# Patient Record
Sex: FEMALE | Race: WHITE | NOT HISPANIC OR LATINO | Employment: FULL TIME | URBAN - NONMETROPOLITAN AREA
[De-identification: names, ages, dates, MRNs, and addresses within clinical notes are randomized per-mention and may not be internally consistent; named-entity substitution may affect disease eponyms.]

---

## 2023-05-12 ENCOUNTER — APPOINTMENT (OUTPATIENT)
Dept: RADIOLOGY | Facility: CLINIC | Age: 48
End: 2023-05-12

## 2023-05-12 ENCOUNTER — OFFICE VISIT (OUTPATIENT)
Dept: URGENT CARE | Facility: CLINIC | Age: 48
End: 2023-05-12

## 2023-05-12 VITALS — TEMPERATURE: 98.3 F | HEART RATE: 86 BPM | OXYGEN SATURATION: 97 % | RESPIRATION RATE: 18 BRPM

## 2023-05-12 DIAGNOSIS — M25.511 ACUTE PAIN OF RIGHT SHOULDER: ICD-10-CM

## 2023-05-12 DIAGNOSIS — M75.31 CALCIFIC TENDINITIS OF RIGHT SHOULDER: Primary | ICD-10-CM

## 2023-05-12 RX ORDER — PREDNISONE 10 MG/1
10 TABLET ORAL DAILY
Qty: 5 TABLET | Refills: 0 | Status: CANCELLED
Start: 2023-05-12 | End: 2023-05-17

## 2023-05-12 RX ORDER — ENALAPRIL MALEATE AND HYDROCHLOROTHIAZIDE 10; 25 MG/1; MG/1
1 TABLET ORAL DAILY
COMMUNITY

## 2023-05-12 RX ORDER — ATORVASTATIN CALCIUM 20 MG/1
20 TABLET, FILM COATED ORAL DAILY
COMMUNITY

## 2023-05-12 RX ORDER — FENOFIBRATE 160 MG/1
160 TABLET ORAL DAILY
COMMUNITY

## 2023-05-12 NOTE — PROGRESS NOTES
Assessment/Plan    Calcific tendinitis of right shoulder [M75 31]  1  Calcific tendinitis of right shoulder  Ambulatory Referral to Physical Therapy      2  Acute pain of right shoulder  XR shoulder 2+ vw right        XR R shoulder: chronic calcific changes noted per my read  Pending final radiology read  Advised to rest, use ice, take OTC Ibuprofen and Tylenol  May use topical Voltaren as needed  Discussed discontinuing use of sling and risks associated with continued used noted with Calcific Tendinitis  Referral provided for PT  Verbal order for Prednisone called into [facility] on [address], [place]  Take Prednisone taper as directed  Recommended to follow up with Ortho, or preferred specialist      Subjective:     Patient ID: Marilyn Lynn is a 50 y o  female  Reason For Visit / Chief Complaint  Chief Complaint   Patient presents with   • Shoulder Pain     Right shoulder pain initial onset 5/9/23 denies any trauma tried nsaids and chiropractor without any relief presents to clinic with shoulder to right shoulder         Pt is a 51 y/o F who presents to the clinic today with c/o R shoulder pain that started 5/9/23  Denies any acute injury or trama to the R shoulder  On 5/10/23 went to the chiropractor, with no relief  Pt rating current pain 9/10 on numeric pain scale, nothing makes it better, feels like she is having a spasm in her R shoulder blade  Pt went to work today, came home and said she was unable to dress herself or use the toilet because she is R hand dominant  Applied a R arm/shoulder sling, with no relief  Per pt she tried ice, topical Voltaren, Toradol, Ibuprofen and utilizing a hot tub with no relief  LMP approx 1 month ago, menstrual periods noted to be irregular with a history of a Tubal Ligation  Denies any numbness or tingling       Follows with Ortho, Sports Med and pain management for bursitis of L shoulder per pt she has received an injection into her L shoulder that resolved the pain  Past Medical History:   Diagnosis Date   • Hyperlipemia    • Hypertension        Past Surgical History:   Procedure Laterality Date   • CHOLECYSTECTOMY     • TUBAL LIGATION         History reviewed  No pertinent family history  Review of Systems   Constitutional: Positive for activity change  Negative for chills and fever  HENT: Negative  Eyes: Negative  Respiratory: Negative  Cardiovascular: Negative  Gastrointestinal: Negative  Genitourinary: Negative  Musculoskeletal: Positive for arthralgias, joint swelling and myalgias  Negative for back pain  Skin: Negative  Neurological: Negative  All other systems reviewed and are negative  Objective:    Pulse 86   Temp 98 3 °F (36 8 °C)   Resp 18   SpO2 97%     Physical Exam  Constitutional:       Appearance: Normal appearance  HENT:      Head: Normocephalic  Mouth/Throat:      Mouth: Mucous membranes are moist    Cardiovascular:      Rate and Rhythm: Normal rate and regular rhythm  Pulses: Normal pulses  Heart sounds: Normal heart sounds  Pulmonary:      Effort: Pulmonary effort is normal       Breath sounds: Normal breath sounds  Musculoskeletal:      Right shoulder: Tenderness and bony tenderness present  No swelling, deformity or crepitus  Decreased range of motion  Decreased strength  Normal pulse  Left shoulder: Normal    Neurological:      Mental Status: She is alert

## 2023-05-12 NOTE — PATIENT INSTRUCTIONS
Rest, Ice, OTC Ibuprofen and topical Voltaren as needed  Order for PT Eval and Tx  Discontinue use of sling  Advise to go to the ER with any worsen symptoms, any numbness, tingling or increased pain

## 2024-08-13 ENCOUNTER — OFFICE VISIT (OUTPATIENT)
Dept: URGENT CARE | Facility: CLINIC | Age: 49
End: 2024-08-13
Payer: OTHER GOVERNMENT

## 2024-08-13 ENCOUNTER — APPOINTMENT (OUTPATIENT)
Dept: RADIOLOGY | Facility: CLINIC | Age: 49
End: 2024-08-13
Payer: OTHER GOVERNMENT

## 2024-08-13 VITALS
RESPIRATION RATE: 18 BRPM | TEMPERATURE: 98.7 F | HEART RATE: 91 BPM | SYSTOLIC BLOOD PRESSURE: 124 MMHG | OXYGEN SATURATION: 97 % | DIASTOLIC BLOOD PRESSURE: 82 MMHG

## 2024-08-13 DIAGNOSIS — S91.331A PUNCTURE WOUND OF RIGHT FOOT, INITIAL ENCOUNTER: Primary | ICD-10-CM

## 2024-08-13 DIAGNOSIS — M79.671 RIGHT FOOT PAIN: ICD-10-CM

## 2024-08-13 PROCEDURE — 99213 OFFICE O/P EST LOW 20 MIN: CPT

## 2024-08-13 PROCEDURE — 73620 X-RAY EXAM OF FOOT: CPT

## 2024-08-13 RX ORDER — SIMVASTATIN 20 MG
20 TABLET ORAL
COMMUNITY

## 2024-08-13 RX ORDER — CEPHALEXIN 500 MG/1
500 CAPSULE ORAL EVERY 8 HOURS SCHEDULED
Qty: 21 CAPSULE | Refills: 0 | Status: SHIPPED | OUTPATIENT
Start: 2024-08-13 | End: 2024-08-20

## 2024-08-13 RX ORDER — GABAPENTIN 100 MG/1
100 CAPSULE ORAL 3 TIMES DAILY
COMMUNITY

## 2024-08-13 NOTE — PROGRESS NOTES
[facility]        NAME: Nicole Quinones is a 49 y.o. female  : 1975    MRN: [id number]  DATE: 2024  TIME: 3:40 PM    Assessment and Plan   Puncture wound of right foot, initial encounter [S91.331A]  1. Puncture wound of right foot, initial encounter  XR foot 2 vw right    cephalexin (KEFLEX) 500 mg capsule        No obvious foreign body on x-ray.  Official x-ray read pending.  Informed patient that it is possible that there could be a small wooden sliver and that it will work its way out with time and frequent warm compresses and soaks.  I offered to provide local anesthetic and make small incision to try to search for a possible wooden sliver, patient declined at this time.  She has a diabetic, will prophylactically put on Keflex.  No obvious signs of bacterial infection at this time.  Patient states her last tetanus shot was about 8 years ago, however, she does not want an updated booster at this time.  Advised follow-up family doctor if continued symptoms.  Advised to go to the ER if any symptoms worsen.    Patient Instructions     Take prescribed medication as instructed.  Eat yogurt with live and active cultures and/or take a probiotic at least 3 hours before or after antibiotic dose. Monitor stool for diarrhea and/or blood. If this occurs, contact primary care doctor ASAP.    Tylenol for pain.  May do frequent warm soaks or compresses throughout the day.  If they is a very small wood sliver, it will work its way out on its own with time.  If you are still having discomfort, recommended to seek medical attention.  Follow up with PCP in 3-5 days.  Proceed to  ER if symptoms worsen.    If tests are performed, our office will contact you with results only if changes need to made to the care plan discussed with you at the visit. You can review your full results on [facility].    Chief Complaint     Chief Complaint   Patient presents with    Pain     To ball of right foot  appears to be a tiny puncture wound there onset of pain when she woke today type 2 diabetic          History of Present Illness       49-year-old female presents the clinic with pain in the ball of her right foot.  States that there is a small wound, unsure if she stepped on something.  Patient was walking barefoot on her wooden deck yesterday, believes that this may be the cause.  States that she is a diabetic and has neuropathy in her feet.  Pain with weightbearing and touching the area.  Denies any wound drainage, fever, chills, swelling.  Patient believes her last tetanus shot was about 8 years ago, but does not want an updated booster here in the clinic.        Review of Systems   Review of Systems   Constitutional: Negative.    Respiratory: Negative.     Cardiovascular: Negative.    Skin:  Positive for wound (puncture wound to ball of right foot).         Current Medications       Current Outpatient Medications:     cephalexin (KEFLEX) 500 mg capsule, Take 1 capsule (500 mg total) by mouth every 8 (eight) hours for 7 days, Disp: 21 capsule, Rfl: 0    enalapril-hydrochlorothiazide (VASERETIC) 10-25 MG per tablet, Take 1 tablet by mouth daily, Disp: , Rfl:     fenofibrate 160 MG tablet, Take 160 mg by mouth daily, Disp: , Rfl:     gabapentin (NEURONTIN) 100 mg capsule, Take 100 mg by mouth 3 (three) times a day, Disp: , Rfl:     HYDROcodone-acetaminophen (HYCET) 7.5-325 MG/15ML solution, Take by mouth every 6 (six) hours as needed for moderate pain, Disp: , Rfl:     semaglutide (Rybelsus) 3 MG tablet, Take 7 mg by mouth daily before breakfast, Disp: , Rfl:     simvastatin (ZOCOR) 20 mg tablet, Take 20 mg by mouth daily at bedtime, Disp: , Rfl:     atorvastatin (Lipitor) 20 mg tablet, Take 20 mg by mouth daily (Patient not taking: Reported on 8/13/2024), Disp: , Rfl:     Current Allergies     Allergies as of 08/13/2024 - Reviewed 08/13/2024   Allergen Reaction Noted    Codeine Rash 05/12/2023    Farxiga  [dapagliflozin] Rash 05/12/2023    Wellbutrin [bupropion] Rash 05/12/2023            The following portions of the patient's history were reviewed and updated as appropriate: allergies, current medications, past family history, past medical history, past social history, past surgical history and problem list.     Past Medical History:   Diagnosis Date    Diabetes mellitus (HCC)     Hyperlipemia     Hypertension        Past Surgical History:   Procedure Laterality Date    CHOLECYSTECTOMY      TUBAL LIGATION         No family history on file.      Medications have been verified.        Objective   /82   Pulse 91   Temp 98.7 °F (37.1 °C)   Resp 18   SpO2 97%        Physical Exam     Physical Exam  Constitutional:       General: She is not in acute distress.     Appearance: Normal appearance. She is not ill-appearing or diaphoretic.   Cardiovascular:      Rate and Rhythm: Normal rate and regular rhythm.      Pulses: Normal pulses.      Heart sounds: Normal heart sounds.   Pulmonary:      Effort: Pulmonary effort is normal.      Breath sounds: Normal breath sounds.   Musculoskeletal:        Feet:    Feet:      Comments: Very small puncture wound that is almost pinpoint in size to the ball of the plantar aspect of the right foot.  Mild tenderness.  No surrounding erythema or drainage.  Slight pain with weightbearing.  Normal range of motion of ankle/toes.  Skin:     General: Skin is warm and dry.      Capillary Refill: Capillary refill takes less than 2 seconds.   Neurological:      Mental Status: She is alert and oriented to person, place, and time.   Psychiatric:         Mood and Affect: Mood normal.

## 2024-08-13 NOTE — PATIENT INSTRUCTIONS
"Take prescribed medication as instructed.  Eat yogurt with live and active cultures and/or take a probiotic at least 3 hours before or after antibiotic dose. Monitor stool for diarrhea and/or blood. If this occurs, contact primary care doctor ASAP.    Tylenol for pain.  May do frequent warm soaks or compresses throughout the day.  If they is a very small wood sliver, it will work its way out on its own with time.  If you are still having discomfort, recommended to seek medical attention.  Follow up with PCP in 3-5 days.  Proceed to  ER if symptoms worsen.    If tests are performed, our office will contact you with results only if changes need to made to the care plan discussed with you at the visit. You can review your full results on [facility].  Patient Education     Taking care of cuts, scrapes, and puncture wounds   The Basics   Written by the doctors and editors at [facility]   Does my cut need stitches? -- If your cut does not go all the way through the skin, it does not need stitches (figure 1). If your cut is wide, jagged, or does go all the way through the skin, you will most likely need stitches.  If you are not sure if your cut needs stitches, check with your doctor or nurse. Sometimes they will use special staples instead of stitches. Doctors can also use a special type of skin glue to close certain types of cuts.  This article is about caring for minor wounds (like small cuts and scrapes) that do not need to be closed with stitches, staples, or skin glue. If you got stitches, staples, or glue, your doctor or nurse will tell you how to care for yourself.  What if I have a puncture wound? -- A \"puncture wound\" is a type of cut that is made when a sharp object, like a nail, goes through the skin and into the tissue underneath. This type of wound can also be caused by animal or human bites. Puncture wounds are more likely than other minor wounds to get infected.  If you were bitten by an animal or " human, see your doctor or nurse. Bite wounds need special care.  How do I take care of a minor wound on my own? -- To take care of your wound, follow these basic first aid guidelines:   Clean the wound - Wash it well with soap and water. If there is dirt, glass, or another object in your cut that you can't get out after you wash it, call your doctor or nurse.   Stop the bleeding - If your wound is bleeding, press a clean cloth or bandage firmly on the area for 20 minutes. You can also help slow the bleeding by holding the wound above the level of your heart, if possible. If the bleeding doesn't stop after 20 minutes, call your doctor or nurse.   Put a thin layer of antibiotic ointment on the wound   Cover the wound with a bandage or gauze. Keep the bandage clean and dry. Change the bandage 1 to 2 times every day until your wound heals.   Do not swim or soak your wound in water until it has healed. Ask your doctor or nurse if you have any questions.   Each time you change the bandage, look at your skin to check for signs of infection. These include redness that is getting worse or spreading, swelling, or warmth in the area. You might see some thin clear or yellow fluid as the wound heals, which is normal.  Most minor wounds heal on their own within 7 to 10 days. As your wound heals, a scab will form. Do not pick at the scab or scratch the skin around it.  When should I call the doctor or nurse? -- Call the doctor or nurse if you have any signs of an infection. Signs of an infection include:   Fever   Redness, swelling, warmth, or increased pain around the wound   A bad smell coming from the wound   Pus (thick yellow, green, or gray fluid) draining from the wound   Red streaks on the skin around the wound, or red streaks going up your arm or leg  Will I need a tetanus shot? -- Maybe. It depends on how old you are and when your last tetanus shot was. Tetanus is a serious infection that can cause muscle stiffness and  "spasms, and even lead to death. It is caused by bacteria (germs) that live in the dirt.  Most children get a tetanus vaccine as part of their routine check-ups. Vaccines can prevent certain serious or deadly infections. Many adults also get a tetanus vaccine as part of their routine check-ups. Getting all your vaccines is important, since it's possible to get tetanus even from a small wound.  If your skin is cut or punctured, and especially if the cut is dirty or deep, ask your doctor or nurse if you need a tetanus shot.  All topics are updated as new evidence becomes available and our peer review process is complete.  This topic retrieved from [employer] on: Mar 20, 2024.  Topic 78979 Version 11.0  Release: 32.2.4 - C32.78  © 2024 UpToDate, Inc. and/or its affiliates. All rights reserved.  figure 1: Minor cuts and scrapes     Picture A shows a scrape (also called an \"abrasion\"). The scrape doesn't go all the way through the skin, so it does not need stitches. Picture B shows a cut that does go all the way through the skin. This cut is deep, so it needs stitches.  Graphic 22395 Version 4.0  Consumer Information Use and Disclaimer   Disclaimer: This generalized information is a limited summary of diagnosis, treatment, and/or medication information. It is not meant to be comprehensive and should be used as a tool to help the user understand and/or assess potential diagnostic and treatment options. It does NOT include all information about conditions, treatments, medications, side effects, or risks that may apply to a specific patient. It is not intended to be medical advice or a substitute for the medical advice, diagnosis, or treatment of a health care provider based on the health care provider's examination and assessment of a patient's specific and unique circumstances. Patients must speak with a health care provider for complete information about their health, medical questions, and treatment options, including any " risks or benefits regarding use of medications. This information does not endorse any treatments or medications as safe, effective, or approved for treating a specific patient. UpToDate, Inc. and its affiliates disclaim any warranty or liability relating to this information or the use thereof.The use of this information is governed by the Terms of Use, available at https://www.[employer]com/en/know/clinical-effectiveness-terms. 2024© UpToDate, Inc. and its affiliates and/or licensors. All rights reserved.  Copyright   © 2024 Up[employer]Date, Inc. and/or its affiliates. All rights reserved.